# Patient Record
Sex: FEMALE | Race: WHITE | NOT HISPANIC OR LATINO | URBAN - METROPOLITAN AREA
[De-identification: names, ages, dates, MRNs, and addresses within clinical notes are randomized per-mention and may not be internally consistent; named-entity substitution may affect disease eponyms.]

---

## 2019-12-19 ENCOUNTER — OFFICE VISIT (OUTPATIENT)
Dept: URBAN - METROPOLITAN AREA CLINIC 17 | Facility: CLINIC | Age: 74
End: 2019-12-19
Payer: MEDICARE

## 2019-12-19 DIAGNOSIS — Z96.1 PRESENCE OF INTRAOCULAR LENS: ICD-10-CM

## 2019-12-19 DIAGNOSIS — H35.342 MACULAR CYST, HOLE, OR PSEUDOHOLE, LEFT EYE: ICD-10-CM

## 2019-12-19 DIAGNOSIS — H35.3221 EXUDATIVE AGE-RELATED MACULAR DEGENERATION OF LEFT EYE W/ ACTIVE CHOROIDAL NEOVASCULARIZATION: Primary | ICD-10-CM

## 2019-12-19 DIAGNOSIS — H59.812 CHORIORETINAL SCARS AFTER SURGERY FOR DETACHMENT, LEFT EYE: ICD-10-CM

## 2019-12-19 DIAGNOSIS — H44.21 DEGENERATIVE MYOPIA, RIGHT EYE: ICD-10-CM

## 2019-12-19 PROCEDURE — 92134 CPTRZ OPH DX IMG PST SGM RTA: CPT | Performed by: OPHTHALMOLOGY

## 2019-12-19 PROCEDURE — 92014 COMPRE OPH EXAM EST PT 1/>: CPT | Performed by: OPTOMETRIST

## 2019-12-19 PROCEDURE — 92134 CPTRZ OPH DX IMG PST SGM RTA: CPT | Performed by: OPTOMETRIST

## 2019-12-19 PROCEDURE — 67028 INJECTION EYE DRUG: CPT | Performed by: OPHTHALMOLOGY

## 2019-12-19 PROCEDURE — 92014 COMPRE OPH EXAM EST PT 1/>: CPT | Performed by: OPHTHALMOLOGY

## 2019-12-19 ASSESSMENT — INTRAOCULAR PRESSURE
OS: 13
OD: 16
OD: 16
OS: 13

## 2019-12-19 NOTE — IMPRESSION/PLAN
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 long standing Plan: Discussed diagnosis in detail with patient.  Consulted with Dr. Abi Grey, ok to see today

## 2019-12-19 NOTE — IMPRESSION/PLAN
Impression: Degenerative myopia, right eye: H44.21. OD. Condition: stable. Vision: vision not affected. Plan: Discussed diagnosis in detail with patient. Exam OD shows macular changes associated with nearsightedness. No treatment is required at this time. Will continue to observe condition and or symptoms. OCT OD shows stable Myopic Fundus.

## 2019-12-19 NOTE — IMPRESSION/PLAN
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 Plan: Discussed diagnosis in detail with patient. No treatment is required at this time. Will continue to observe condition and or symptoms.

## 2019-12-19 NOTE — IMPRESSION/PLAN
Impression: Exudative age-related macular degeneration of left eye w/ active choroidal neovascularization: H35.3221. Plan: Discussed diagnosis in detail with patient. Obtained MAC OCT, consulted with shaila Galarza to see pt today.

## 2019-12-19 NOTE — IMPRESSION/PLAN
Impression: Degenerative myopia w/ choroidal neovascularization, lt eye: H44.2A2. OS. Condition: unstable - guarded prognosis. Vision: vision affected. s/p PPVX for Repair of Macular Hole OS 20 yrs ago elsewhere Plan: Discussed diagnosis in detail with patient. Exam OS shows fluid underneath the retina associated with nearsightedness. Discussed risks of progression with present condition. Based on findings recommend Intravitreal Injection Treatment to help reduce the fluid and prevent a further reduction in vision - guarded prognosis. An examination that was significantly and separately identifiable from the procedure was performed today. Discussed the risks and benefits of tx. All questions answered. Patient elects to proceed with recommendation. OCT shows ? Macular Hole,  Subretinal thickening, ILM / ERM change OS.

## 2019-12-19 NOTE — IMPRESSION/PLAN
Impression: Vitreous degeneration, right eye: H43.811. OD. Condition: stable. Vision: vision not affected. Plan: Posterior vitreous detachment accounts for the patient's complaints. There is no evidence of retinal pathology. All signs and risks of retinal detachment and tears were discussed in detail. Patient instructed to call office immediately if any symptoms noted.

## 2020-01-16 ENCOUNTER — OFFICE VISIT (OUTPATIENT)
Dept: URBAN - METROPOLITAN AREA CLINIC 17 | Facility: CLINIC | Age: 75
End: 2020-01-16
Payer: MEDICARE

## 2020-01-16 DIAGNOSIS — H43.811 VITREOUS DEGENERATION, RIGHT EYE: ICD-10-CM

## 2020-01-16 PROCEDURE — 67028 INJECTION EYE DRUG: CPT | Performed by: OPHTHALMOLOGY

## 2020-01-16 PROCEDURE — 99213 OFFICE O/P EST LOW 20 MIN: CPT | Performed by: OPHTHALMOLOGY

## 2020-01-16 PROCEDURE — 92134 CPTRZ OPH DX IMG PST SGM RTA: CPT | Performed by: OPHTHALMOLOGY

## 2020-01-16 ASSESSMENT — INTRAOCULAR PRESSURE
OD: 14
OS: 12

## 2020-01-16 NOTE — IMPRESSION/PLAN
Impression: Degenerative myopia w/ choroidal neovascularization, lt eye: H44.2A2. OS. Condition: unstable - guarded prognosis. Vision: vision affected. s/p AV OS 12/19/19
s/p PPVX for Repair of Macular Hole OS 20 yrs ago elsewhere Plan: Discussed diagnosis in detail with patient. Exam OS shows fluid associated with nearsightedness. Discussed risks of progression with present condition. Based on findings recommend to continue with Intravitreal Injection Treatment to help reduce the fluid and prevent a further reduction in vision - guarded prognosis. An examination that was significantly and separately identifiable from the procedure was performed today. Discussed the risks and benefits of tx. All questions answered. Patient elects to proceed with recommendation.  OCT shows Myopic SRN w/ decrease in CMT

## 2020-02-25 ENCOUNTER — OFFICE VISIT (OUTPATIENT)
Dept: URBAN - METROPOLITAN AREA CLINIC 17 | Facility: CLINIC | Age: 75
End: 2020-02-25
Payer: MEDICARE

## 2020-02-25 DIAGNOSIS — H44.2A2 DEGENERATIVE MYOPIA W/ CHOROIDAL NEOVASCULARIZATION, LT EYE: Primary | ICD-10-CM

## 2020-02-25 PROCEDURE — 99213 OFFICE O/P EST LOW 20 MIN: CPT | Performed by: OPHTHALMOLOGY

## 2020-02-25 PROCEDURE — 92134 CPTRZ OPH DX IMG PST SGM RTA: CPT | Performed by: OPHTHALMOLOGY

## 2020-02-25 ASSESSMENT — INTRAOCULAR PRESSURE
OD: 19
OS: 17

## 2020-02-25 NOTE — IMPRESSION/PLAN
Impression: Degenerative myopia w/ choroidal neovascularization, lt eye: H44.2A2. OS. Condition: stable - guarded prognosis. Vision: vision affected. s/p AV OS 01/16/2020,  AV OS 12/19/19 w/Dr Haney
s/p PPVX for Repair of Macular Hole OS 20 yrs ago elsewhere Plan: Discussed diagnosis in detail with patient. No treatment is required at this time based on exam and OCT. Recommend observation for now. Will reassess condition in 6 wks. OCT shows no obvious fluid seen OS.

## 2020-05-11 ENCOUNTER — OFFICE VISIT (OUTPATIENT)
Dept: URBAN - METROPOLITAN AREA CLINIC 23 | Facility: CLINIC | Age: 75
End: 2020-05-11
Payer: MEDICARE

## 2020-05-11 PROCEDURE — 99213 OFFICE O/P EST LOW 20 MIN: CPT | Performed by: OPHTHALMOLOGY

## 2020-05-11 ASSESSMENT — INTRAOCULAR PRESSURE
OD: 18
OS: 16

## 2020-05-11 NOTE — IMPRESSION/PLAN
Impression: Degenerative myopia w/ choroidal neovascularization, lt eye: H44.2A2. OS. Condition: stable - guarded prognosis. Vision: vision affected. s/p AV OS 01/16/2020,  AV OS 12/19/19 w/Dr Haney
s/p PPVX for Repair of Macular Hole OS 20 yrs ago elsewhere Plan: Discussed diagnosis in detail with patient. No treatment is required at this time based on exam and OCT. Recommend observation for now. Will reassess condition in 8 wks. OCT shows SR thickening, minimal fluid OS. No obvious leakage on optos OS.

## 2020-05-11 NOTE — IMPRESSION/PLAN
Impression: Degenerative myopia, right eye: H44.21. OD. Condition: stable. Vision: vision not affected. Plan: Discussed diagnosis in detail with patient. Exam OD shows macular changes associated with nearsightedness. No treatment is required at this time. Will continue to observe condition and or symptoms. OCT OD shows stable Myopic Fundus.  Optos show optic fundus OD

## 2022-04-19 ENCOUNTER — OFFICE VISIT (OUTPATIENT)
Dept: URBAN - METROPOLITAN AREA CLINIC 17 | Facility: CLINIC | Age: 77
End: 2022-04-19
Payer: MEDICARE

## 2022-04-19 DIAGNOSIS — H44.2A2 DEGENERATIVE MYOPIA W/ CHOROIDAL NEOVASCULARIZATION, LT EYE: ICD-10-CM

## 2022-04-19 DIAGNOSIS — H20.011 PRIMARY IRIDOCYCLITIS, RIGHT EYE: Primary | ICD-10-CM

## 2022-04-19 DIAGNOSIS — H44.21 DEGENERATIVE MYOPIA, RIGHT EYE: ICD-10-CM

## 2022-04-19 PROCEDURE — 99213 OFFICE O/P EST LOW 20 MIN: CPT | Performed by: OPHTHALMOLOGY

## 2022-04-19 PROCEDURE — 92134 CPTRZ OPH DX IMG PST SGM RTA: CPT | Performed by: OPHTHALMOLOGY

## 2022-04-19 RX ORDER — DUREZOL 0.5 MG/ML
0.05 % EMULSION OPHTHALMIC
Qty: 5 | Refills: 5 | Status: ACTIVE
Start: 2022-04-19

## 2022-04-19 ASSESSMENT — INTRAOCULAR PRESSURE
OD: 16
OS: 14

## 2022-04-19 NOTE — IMPRESSION/PLAN
Impression: Degenerative myopia, right eye: H44.21. OD. Condition: stable. Vision: vision not affected. Plan: Discussed diagnosis in detail with patient. Exam OD shows macular changes associated with nearsightedness. No treatment is required at this time. Will continue to observe condition and or symptoms. OCT OD shows stable Myopic Fundus. Optos OD is stable.

## 2022-04-19 NOTE — IMPRESSION/PLAN
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 Condition: new problem addtl w/u needed. Vision: vision affected. Plan: Discussed diagnosis in detail with patient. Exam OD shows mid inflammation in the front of the eye, the retina is stable. Recommend to start Durezol OD QID to help reduce the swelling. Will reassess condition in 2 wks. Patient would like to know why her vision went dark. Unsure why she had that episode.

## 2022-04-19 NOTE — IMPRESSION/PLAN
Impression: Degenerative myopia w/ choroidal neovascularization, lt eye: H44.2A2. OS. Condition: stable - guarded prognosis. Vision: vision affected. s/p AV OS 01/16/2020,  AV OS 12/19/19 w/Dr Haney
s/p PPVX for Repair of Macular Hole OS 20 yrs ago elsewhere Plan: Discussed diagnosis in detail with patient. No treatment is required at this time based on exam and diagnostic tests. Recommend observation for now. Will reassess condition in 8 wks. OCT shows a stable Myopic Fundus and Optos OS is stable.